# Patient Record
Sex: MALE | Race: WHITE
[De-identification: names, ages, dates, MRNs, and addresses within clinical notes are randomized per-mention and may not be internally consistent; named-entity substitution may affect disease eponyms.]

---

## 2018-08-11 ENCOUNTER — HOSPITAL ENCOUNTER (EMERGENCY)
Dept: HOSPITAL 95 - ER | Age: 73
Discharge: HOME | End: 2018-08-11
Payer: MEDICARE

## 2018-08-11 VITALS — BODY MASS INDEX: 27.59 KG/M2 | HEIGHT: 74 IN | WEIGHT: 214.99 LBS

## 2018-08-11 DIAGNOSIS — Z79.84: ICD-10-CM

## 2018-08-11 DIAGNOSIS — Z79.4: ICD-10-CM

## 2018-08-11 DIAGNOSIS — Z87.891: ICD-10-CM

## 2018-08-11 DIAGNOSIS — L03.116: Primary | ICD-10-CM

## 2018-08-11 DIAGNOSIS — E11.9: ICD-10-CM

## 2018-08-11 DIAGNOSIS — Z79.899: ICD-10-CM

## 2018-08-11 LAB
ALBUMIN SERPL BCP-MCNC: 3.3 G/DL (ref 3.4–5)
ALBUMIN/GLOB SERPL: 0.8 {RATIO} (ref 0.8–1.8)
ALT SERPL W P-5'-P-CCNC: 23 U/L (ref 12–78)
ANION GAP SERPL CALCULATED.4IONS-SCNC: 9 MMOL/L (ref 6–16)
AST SERPL W P-5'-P-CCNC: 16 U/L (ref 12–37)
BASOPHILS # BLD AUTO: 0.05 K/MM3 (ref 0–0.23)
BASOPHILS NFR BLD AUTO: 1 % (ref 0–2)
BILIRUB SERPL-MCNC: 0.8 MG/DL (ref 0.1–1)
BUN SERPL-MCNC: 8 MG/DL (ref 8–24)
CALCIUM SERPL-MCNC: 9.1 MG/DL (ref 8.5–10.1)
CHLORIDE SERPL-SCNC: 101 MMOL/L (ref 98–108)
CO2 SERPL-SCNC: 25 MMOL/L (ref 21–32)
CREAT SERPL-MCNC: 0.73 MG/DL (ref 0.6–1.2)
CRP SERPL HS-MCNC: 4.41 MG/L (ref 0–3)
DEPRECATED RDW RBC AUTO: 39.1 FL (ref 35.1–46.3)
EOSINOPHIL # BLD AUTO: 0.11 K/MM3 (ref 0–0.68)
EOSINOPHIL NFR BLD AUTO: 2 % (ref 0–6)
ERYTHROCYTE [DISTWIDTH] IN BLOOD BY AUTOMATED COUNT: 12.3 % (ref 11.7–14.2)
GLOBULIN SER CALC-MCNC: 4.1 G/DL (ref 2.2–4)
GLUCOSE SERPL-MCNC: 336 MG/DL (ref 70–99)
HCT VFR BLD AUTO: 41.3 % (ref 37–53)
HGB BLD-MCNC: 14.4 G/DL (ref 13.5–17.5)
IMM GRANULOCYTES # BLD AUTO: 0.02 K/MM3 (ref 0–0.1)
IMM GRANULOCYTES NFR BLD AUTO: 0 % (ref 0–1)
LYMPHOCYTES # BLD AUTO: 1.98 K/MM3 (ref 0.84–5.2)
LYMPHOCYTES NFR BLD AUTO: 31 % (ref 21–46)
MCHC RBC AUTO-ENTMCNC: 34.9 G/DL (ref 31.5–36.5)
MCV RBC AUTO: 86 FL (ref 80–100)
MONOCYTES # BLD AUTO: 0.59 K/MM3 (ref 0.16–1.47)
MONOCYTES NFR BLD AUTO: 9 % (ref 4–13)
NEUTROPHILS # BLD AUTO: 3.6 K/MM3 (ref 1.96–9.15)
NEUTROPHILS NFR BLD AUTO: 57 % (ref 41–73)
NRBC # BLD AUTO: 0 K/MM3 (ref 0–0.02)
NRBC BLD AUTO-RTO: 0 /100 WBC (ref 0–0.2)
PLATELET # BLD AUTO: 227 K/MM3 (ref 150–400)
POTASSIUM SERPL-SCNC: 4.1 MMOL/L (ref 3.5–5.5)
PROT SERPL-MCNC: 7.4 G/DL (ref 6.4–8.2)
SODIUM SERPL-SCNC: 135 MMOL/L (ref 136–145)

## 2019-01-02 ENCOUNTER — HOSPITAL ENCOUNTER (OUTPATIENT)
Dept: HOSPITAL 95 - LAB SHORT | Age: 74
Discharge: HOME | End: 2019-01-02
Attending: OTOLARYNGOLOGY
Payer: MEDICARE

## 2019-01-02 DIAGNOSIS — C44.329: Primary | ICD-10-CM

## 2019-01-18 ENCOUNTER — HOSPITAL ENCOUNTER (OUTPATIENT)
Dept: HOSPITAL 95 - ORSCSDS | Age: 74
Discharge: HOME | End: 2019-01-18
Attending: OTOLARYNGOLOGY
Payer: MEDICARE

## 2019-01-18 VITALS — BODY MASS INDEX: 28.25 KG/M2 | WEIGHT: 220.13 LBS | HEIGHT: 74.02 IN

## 2019-01-18 DIAGNOSIS — J44.9: ICD-10-CM

## 2019-01-18 DIAGNOSIS — Z87.891: ICD-10-CM

## 2019-01-18 DIAGNOSIS — K21.9: ICD-10-CM

## 2019-01-18 DIAGNOSIS — E11.9: ICD-10-CM

## 2019-01-18 DIAGNOSIS — C07: Primary | ICD-10-CM

## 2019-01-18 DIAGNOSIS — Z79.4: ICD-10-CM

## 2019-01-18 DIAGNOSIS — I10: ICD-10-CM

## 2019-01-18 DIAGNOSIS — Z79.899: ICD-10-CM

## 2019-01-18 PROCEDURE — 0CT90ZZ RESECTION OF LEFT PAROTID GLAND, OPEN APPROACH: ICD-10-PCS | Performed by: OTOLARYNGOLOGY

## 2019-01-18 NOTE — NUR
01/18/19 [address] AND FAMILY AT PTS BEDSIDE. DENIES P[AIN AND DENIES NAUSEA AT
THIS TIME

## 2019-04-04 ENCOUNTER — HOSPITAL ENCOUNTER (OUTPATIENT)
Dept: HOSPITAL 95 - PLD | Age: 74
Discharge: HOME | End: 2019-04-04
Attending: DERMATOLOGY
Payer: MEDICARE

## 2019-04-04 DIAGNOSIS — L57.0: ICD-10-CM

## 2019-04-04 DIAGNOSIS — D04.61: Primary | ICD-10-CM
